# Patient Record
Sex: FEMALE | Race: BLACK OR AFRICAN AMERICAN | NOT HISPANIC OR LATINO | Employment: UNEMPLOYED | URBAN - METROPOLITAN AREA
[De-identification: names, ages, dates, MRNs, and addresses within clinical notes are randomized per-mention and may not be internally consistent; named-entity substitution may affect disease eponyms.]

---

## 2017-01-23 ENCOUNTER — HISTORICAL (OUTPATIENT)
Dept: INTERNAL MEDICINE | Facility: CLINIC | Age: 47
End: 2017-01-23

## 2017-08-28 ENCOUNTER — HISTORICAL (OUTPATIENT)
Dept: WOUND CARE | Facility: HOSPITAL | Age: 47
End: 2017-08-28

## 2017-08-28 LAB
HAV IGM SERPL QL IA: NONREACTIVE
HBV CORE IGM SERPL QL IA: NONREACTIVE
HBV SURFACE AG SERPL QL IA: NEGATIVE
HCV AB SERPL QL IA: NONREACTIVE
HIV 1+2 AB+HIV1 P24 AG SERPL QL IA: NONREACTIVE
T PALLIDUM AB SER QL: NONREACTIVE

## 2017-09-12 ENCOUNTER — HISTORICAL (OUTPATIENT)
Dept: WOUND CARE | Facility: HOSPITAL | Age: 47
End: 2017-09-12

## 2018-04-16 ENCOUNTER — HISTORICAL (OUTPATIENT)
Dept: INTERNAL MEDICINE | Facility: CLINIC | Age: 48
End: 2018-04-16

## 2018-04-16 LAB
ABS NEUT (OLG): 3.93 X10(3)/MCL (ref 2.1–9.2)
ALBUMIN SERPL-MCNC: 3.8 GM/DL (ref 3.4–5)
ALBUMIN/GLOB SERPL: 1 RATIO (ref 1–2)
ALP SERPL-CCNC: 111 UNIT/L (ref 45–117)
ALT SERPL-CCNC: 25 UNIT/L (ref 12–78)
APPEARANCE, UA: ABNORMAL
AST SERPL-CCNC: 14 UNIT/L (ref 15–37)
BACTERIA #/AREA URNS AUTO: ABNORMAL /[HPF]
BASOPHILS # BLD AUTO: 0.05 X10(3)/MCL
BASOPHILS NFR BLD AUTO: 1 %
BILIRUB SERPL-MCNC: 0.3 MG/DL (ref 0.2–1)
BILIRUB UR QL STRIP: NEGATIVE
BILIRUBIN DIRECT+TOT PNL SERPL-MCNC: <0.1 MG/DL
BILIRUBIN DIRECT+TOT PNL SERPL-MCNC: ABNORMAL MG/DL
BUN SERPL-MCNC: 18 MG/DL (ref 7–18)
CALCIUM SERPL-MCNC: 8.8 MG/DL (ref 8.5–10.1)
CHLORIDE SERPL-SCNC: 105 MMOL/L (ref 98–107)
CHOLEST SERPL-MCNC: 230 MG/DL
CHOLEST/HDLC SERPL: 3.4 {RATIO} (ref 0–4.4)
CO2 SERPL-SCNC: 29 MMOL/L (ref 21–32)
COLOR UR: YELLOW
CREAT SERPL-MCNC: 1 MG/DL (ref 0.6–1.3)
EOSINOPHIL # BLD AUTO: 0.13 10*3/UL
EOSINOPHIL NFR BLD AUTO: 2 %
ERYTHROCYTE [DISTWIDTH] IN BLOOD BY AUTOMATED COUNT: 13.8 % (ref 11.5–14.5)
EST. AVERAGE GLUCOSE BLD GHB EST-MCNC: 117 MG/DL
GLOBULIN SER-MCNC: 3.7 GM/ML (ref 2.3–3.5)
GLUCOSE (UA): NORMAL
GLUCOSE SERPL-MCNC: 103 MG/DL (ref 74–106)
HBA1C MFR BLD: 5.7 % (ref 4.2–6.3)
HCT VFR BLD AUTO: 39.4 % (ref 35–46)
HDLC SERPL-MCNC: 67 MG/DL
HGB BLD-MCNC: 12 GM/DL (ref 12–16)
HGB UR QL STRIP: NEGATIVE
HYALINE CASTS #/AREA URNS LPF: 0 /[LPF]
IMM GRANULOCYTES # BLD AUTO: 0.01 10*3/UL
IMM GRANULOCYTES NFR BLD AUTO: 0 %
KETONES UR QL STRIP: ABNORMAL
LDLC SERPL CALC-MCNC: 133 MG/DL (ref 0–130)
LEUKOCYTE ESTERASE UR QL STRIP: 75 LEU/UL
LYMPHOCYTES # BLD AUTO: 2.97 X10(3)/MCL
LYMPHOCYTES NFR BLD AUTO: 39 % (ref 13–40)
MCH RBC QN AUTO: 21.6 PG (ref 26–34)
MCHC RBC AUTO-ENTMCNC: 30.5 GM/DL (ref 31–37)
MCV RBC AUTO: 71 FL (ref 80–100)
MONOCYTES # BLD AUTO: 0.5 X10(3)/MCL
MONOCYTES NFR BLD AUTO: 7 % (ref 4–12)
MUCOUS THREADS URNS QL MICRO: ABNORMAL
NEUTROPHILS # BLD AUTO: 3.93 X10(3)/MCL
NEUTROPHILS NFR BLD AUTO: 52 X10(3)/MCL
NITRITE UR QL STRIP: NEGATIVE
PH UR STRIP: 5.5 [PH] (ref 4.5–8)
PLATELET # BLD AUTO: 301 X10(3)/MCL (ref 130–400)
PMV BLD AUTO: 11 FL (ref 7.4–10.4)
POTASSIUM SERPL-SCNC: 4 MMOL/L (ref 3.5–5.1)
PROT SERPL-MCNC: 7.5 GM/DL (ref 6.4–8.2)
PROT UR QL STRIP: 30 MG/DL
RBC # BLD AUTO: 5.55 X10(6)/MCL (ref 4–5.2)
RBC #/AREA URNS AUTO: ABNORMAL /[HPF]
SODIUM SERPL-SCNC: 141 MMOL/L (ref 136–145)
SP GR UR STRIP: 1.03 (ref 1–1.03)
SQUAMOUS #/AREA URNS LPF: >100 /[LPF]
TRIGL SERPL-MCNC: 151 MG/DL
TSH SERPL-ACNC: 1.52 MIU/L (ref 0.36–3.74)
UROBILINOGEN UR STRIP-ACNC: 2 MG/DL
VLDLC SERPL CALC-MCNC: 30 MG/DL
WBC # SPEC AUTO: 7.6 X10(3)/MCL (ref 4.5–11)
WBC #/AREA URNS AUTO: ABNORMAL /HPF

## 2018-06-21 ENCOUNTER — HISTORICAL (OUTPATIENT)
Dept: ADMINISTRATIVE | Facility: HOSPITAL | Age: 48
End: 2018-06-21

## 2018-08-29 LAB
HUMAN PAPILLOMAVIRUS (HPV): NORMAL
PAP RECOMMENDATION EXT: NORMAL
PAP SMEAR: NORMAL

## 2018-09-13 ENCOUNTER — HISTORICAL (OUTPATIENT)
Dept: INTERNAL MEDICINE | Facility: CLINIC | Age: 48
End: 2018-09-13

## 2018-09-13 LAB
ABS NEUT (OLG): 5.3 X10(3)/MCL (ref 2.1–9.2)
BASOPHILS # BLD AUTO: 0.02 X10(3)/MCL
BASOPHILS NFR BLD AUTO: 0 %
CHOLEST SERPL-MCNC: 211 MG/DL
CHOLEST/HDLC SERPL: 3 {RATIO} (ref 0–4.4)
EOSINOPHIL # BLD AUTO: 0.12 X10(3)/MCL
EOSINOPHIL NFR BLD AUTO: 1 %
ERYTHROCYTE [DISTWIDTH] IN BLOOD BY AUTOMATED COUNT: 14.3 % (ref 11.5–14.5)
HCT VFR BLD AUTO: 40.6 % (ref 35–46)
HDLC SERPL-MCNC: 71 MG/DL
HGB BLD-MCNC: 12.4 GM/DL (ref 12–16)
IMM GRANULOCYTES # BLD AUTO: 0.02 10*3/UL
IMM GRANULOCYTES NFR BLD AUTO: 0 %
LDLC SERPL CALC-MCNC: 110 MG/DL (ref 0–130)
LYMPHOCYTES # BLD AUTO: 2.61 X10(3)/MCL
LYMPHOCYTES NFR BLD AUTO: 30 % (ref 13–40)
MCH RBC QN AUTO: 21.7 PG (ref 26–34)
MCHC RBC AUTO-ENTMCNC: 30.5 GM/DL (ref 31–37)
MCV RBC AUTO: 71 FL (ref 80–100)
MONOCYTES # BLD AUTO: 0.55 X10(3)/MCL
MONOCYTES NFR BLD AUTO: 6 % (ref 4–12)
NEUTROPHILS # BLD AUTO: 5.3 X10(3)/MCL
NEUTROPHILS NFR BLD AUTO: 62 X10(3)/MCL
PLATELET # BLD AUTO: 310 X10(3)/MCL (ref 130–400)
PMV BLD AUTO: 11.1 FL (ref 7.4–10.4)
RBC # BLD AUTO: 5.72 X10(6)/MCL (ref 4–5.2)
TRIGL SERPL-MCNC: 149 MG/DL
VLDLC SERPL CALC-MCNC: 30 MG/DL
WBC # SPEC AUTO: 8.6 X10(3)/MCL (ref 4.5–11)

## 2021-04-24 ENCOUNTER — HISTORICAL (OUTPATIENT)
Dept: LAB | Facility: HOSPITAL | Age: 51
End: 2021-04-24

## 2021-04-24 LAB
ABS NEUT (OLG): 4.12 X10(3)/MCL (ref 2.1–9.2)
ALBUMIN SERPL-MCNC: 4 GM/DL (ref 3.5–5)
ALBUMIN/GLOB SERPL: 1.2 RATIO (ref 1.1–2)
ALP SERPL-CCNC: 81 UNIT/L (ref 40–150)
ALT SERPL-CCNC: 19 UNIT/L (ref 0–55)
APPEARANCE, UA: CLEAR
AST SERPL-CCNC: 15 UNIT/L (ref 5–34)
BASOPHILS # BLD AUTO: 0 X10(3)/MCL (ref 0–0.2)
BASOPHILS NFR BLD AUTO: 0 %
BILIRUB SERPL-MCNC: 0.5 MG/DL
BILIRUB UR QL STRIP: NEGATIVE
BILIRUBIN DIRECT+TOT PNL SERPL-MCNC: 0.2 MG/DL (ref 0–0.5)
BILIRUBIN DIRECT+TOT PNL SERPL-MCNC: 0.3 MG/DL (ref 0–0.8)
BUN SERPL-MCNC: 18.1 MG/DL (ref 9.8–20.1)
CALCIUM SERPL-MCNC: 9.6 MG/DL (ref 8.4–10.2)
CHLORIDE SERPL-SCNC: 105 MMOL/L (ref 98–107)
CHOLEST SERPL-MCNC: 227 MG/DL
CHOLEST/HDLC SERPL: 3 {RATIO} (ref 0–5)
CO2 SERPL-SCNC: 28 MMOL/L (ref 22–29)
COLOR UR: NORMAL
CREAT SERPL-MCNC: 0.83 MG/DL (ref 0.55–1.02)
EOSINOPHIL # BLD AUTO: 0.1 X10(3)/MCL (ref 0–0.9)
EOSINOPHIL NFR BLD AUTO: 2 %
ERYTHROCYTE [DISTWIDTH] IN BLOOD BY AUTOMATED COUNT: 14.1 % (ref 11.5–14.5)
EST. AVERAGE GLUCOSE BLD GHB EST-MCNC: 116.9 MG/DL
GLOBULIN SER-MCNC: 3.2 GM/DL (ref 2.4–3.5)
GLUCOSE (UA): NEGATIVE
GLUCOSE SERPL-MCNC: 101 MG/DL (ref 74–100)
HBA1C MFR BLD: 5.7 %
HCT VFR BLD AUTO: 40.2 % (ref 35–46)
HDLC SERPL-MCNC: 78 MG/DL (ref 35–60)
HEMOCCULT STL QL IA: NEGATIVE
HGB BLD-MCNC: 12.2 GM/DL (ref 12–16)
HGB UR QL STRIP: NEGATIVE
IMM GRANULOCYTES # BLD AUTO: 0.01 10*3/UL
IMM GRANULOCYTES NFR BLD AUTO: 0 %
KETONES UR QL STRIP: NEGATIVE
LDLC SERPL CALC-MCNC: 132 MG/DL (ref 50–140)
LEUKOCYTE ESTERASE UR QL STRIP: NEGATIVE
LYMPHOCYTES # BLD AUTO: 2.6 X10(3)/MCL (ref 0.6–4.6)
LYMPHOCYTES NFR BLD AUTO: 36 %
MCH RBC QN AUTO: 22.2 PG (ref 26–34)
MCHC RBC AUTO-ENTMCNC: 30.3 GM/DL (ref 31–37)
MCV RBC AUTO: 73.1 FL (ref 80–100)
MONOCYTES # BLD AUTO: 0.4 X10(3)/MCL (ref 0.1–1.3)
MONOCYTES NFR BLD AUTO: 6 %
NEUTROPHILS # BLD AUTO: 4.12 X10(3)/MCL (ref 2.1–9.2)
NEUTROPHILS NFR BLD AUTO: 56 %
NITRITE UR QL STRIP: NEGATIVE
PH UR STRIP: 6 [PH] (ref 4.5–8)
PLATELET # BLD AUTO: 277 X10(3)/MCL (ref 130–400)
PMV BLD AUTO: 9.8 FL (ref 7.4–10.4)
POTASSIUM SERPL-SCNC: 4.2 MMOL/L (ref 3.5–5.1)
PROT SERPL-MCNC: 7.2 GM/DL (ref 6.4–8.3)
PROT UR QL STRIP: NEGATIVE
RBC # BLD AUTO: 5.5 X10(6)/MCL (ref 4–5.2)
SODIUM SERPL-SCNC: 141 MMOL/L (ref 136–145)
SP GR UR STRIP: 1.02 (ref 1–1.03)
TRIGL SERPL-MCNC: 84 MG/DL (ref 37–140)
TSH SERPL-ACNC: 1.26 UIU/ML (ref 0.35–4.94)
UROBILINOGEN UR STRIP-ACNC: NORMAL
VLDLC SERPL CALC-MCNC: 17 MG/DL
WBC # SPEC AUTO: 7.3 X10(3)/MCL (ref 4.5–11)

## 2021-06-01 ENCOUNTER — HISTORICAL (OUTPATIENT)
Dept: RADIOLOGY | Facility: HOSPITAL | Age: 51
End: 2021-06-01

## 2021-08-19 ENCOUNTER — HISTORICAL (OUTPATIENT)
Dept: SLEEP MEDICINE | Facility: HOSPITAL | Age: 51
End: 2021-08-19

## 2022-04-07 ENCOUNTER — HISTORICAL (OUTPATIENT)
Dept: ADMINISTRATIVE | Facility: HOSPITAL | Age: 52
End: 2022-04-07

## 2022-04-10 ENCOUNTER — HISTORICAL (OUTPATIENT)
Dept: ADMINISTRATIVE | Facility: HOSPITAL | Age: 52
End: 2022-04-10

## 2022-04-27 VITALS
OXYGEN SATURATION: 99 % | DIASTOLIC BLOOD PRESSURE: 80 MMHG | SYSTOLIC BLOOD PRESSURE: 121 MMHG | BODY MASS INDEX: 28.53 KG/M2 | HEIGHT: 66 IN | WEIGHT: 177.5 LBS

## 2022-05-04 NOTE — HISTORICAL [facility] CERNER
This is a historical note converted from [employer]. Formatting and pictures may have been removed.  Please reference [employer] for original formatting and attached multimedia. Chief Complaint  follow up/pain in spine off and on  History of Present Illness  47 y/o female here for [1] f/u. Pt has hx Migraine HA., Anxiety, obesity. [2] Pt c/o of neck and LBP off and on X 2 years. Denies other complaints.  Review of Systems  Constitutional: negative except as stated in HPI  Eye: negative except as stated in HPI  ENT: negative except as stated in HPI  Respiratory: negative except as stated in HPI  Cardiovascular: negative except as stated in HPI  Gastrointestinal: negative except as stated in HPI  Genitourinary: negative except as stated in HPI  Heme/Lymph: negative except as stated in HPI  Endocrine: negative except as stated in HPI  Immunologic: negative except as stated in HPI  Musculoskeletal: negative except as stated in HPI  Integumentary: negative except as stated in HPI  Neurologic: negative except as stated in HPI  ?   All Other ROS_ negative except as stated in HPI  ?  Physical Exam  Vitals & Measurements  T:?36.7? ?C (Oral)? HR:?84(Peripheral)? RR:?18? BP:?110/73?  HT:?167?cm? HT:?167?cm? WT:?85?kg? WT:?85?kg? BMI:?30.48?  General: Alert and oriented, No acute distress.  Eye: Pupils are equal, round and reactive to light, Extraocular movements are intact.  HENT: Normocephalic.  Neck: Supple, Non-tender, No carotid bruit, No lymphadenopathy.  Respiratory: Lungs are clear to auscultation, Respirations are non-labored, Breath sounds are equal, Symmetrical chest wall expansion.  Cardiovascular: Normal rate, Regular rhythm, No murmur.  Gastrointestinal: Soft, Non-tender, Non-distended, Normal bowel sounds.  Musculoskeletal: Normal range of motion.  Integumentary: Warm, Dry, Intact.  Neurologic: No focal deficits, Cranial Nerves II-XII are grossly intact.  Assessment/Plan  Abnormal CBC  ?CBC in 3 months.  Ordered:  CBC w/  Auto Diff, Routine collect, *Est. 09/21/18 3:00:00 CDT, Blood, Order for future visit, *Est. Stop date 09/21/18 3:00:00 CDT, Lab Collect, Abnormal CBC, 06/21/18 14:16:00 CDT  Office/Outpatient Visit Level 4 Established 52801 PC, Anxiety  HA (headache)  Obesity  Well adult exam  Neck pain  Back pain  Abnormal CBC  HLD (hyperlipidemia), [facility], 06/21/18 14:19:00 CDT  ?  Anxiety  ?Denies SI/HI. ?Pt states has tried a number of anxiety and depression meds without improvement- state meds just make her sleep and more depressed but when not on meds she is fine. States she thinks she just needs counseling. Informed to go to [facility] and speak to a counselor to set up appt or call her medicaid card to find a mental health provider covered on her plan. [3]  Ordered:  Clinic Follow up, *Est. 09/21/18 3:00:00 CDT, in 3 months with CIELO Jerry, Order for future visit, Anxiety  HA (headache)  Obesity  Well adult exam  Neck pain  Back pain, [facility]  Office/Outpatient Visit Level 4 Established [id number] PC, Anxiety  HA (headache)  Obesity  Well adult exam  Neck pain  Back pain  Abnormal CBC  HLD (hyperlipidemia), [facility], 06/21/18 14:19:00 CDT  ?  Back pain  ?XR L-spine today. Refilled Meloxicam as prescribed prn pain.  Ordered:  meloxicam, 7.5 mg = 1 tab(s), Oral, BID, PRN PRN pain, # 60 tab(s), 6 Refill(s), Pharmacy: [facility] [place]note 2[place] [id number]  Clinic Follow up, *Est. 09/21/18 3:00:00 CDT, in 3 months with CIELO Jerry, Order for future visit, Anxiety  HA (headache)  Obesity  Well adult exam  Neck pain  Back pain, [facility]  Office/Outpatient Visit Level 4 Established 23745 PC, Anxiety  HA (headache)  Obesity  Well adult exam  Neck pain  Back pain  Abnormal CBC  HLD (hyperlipidemia), [facility], 06/21/18 14:19:00 CDT  XR Spine Lumbar 2 or 3 Views, Routine, *Est. 06/21/18 3:00:00 CDT, Back Pain, None, Ambulatory, Rad Type, Order for future visit, Back pain, *Est. 06/21/18  3:00:00 CDT  ?  HA (headache)  ?States controlled with Fioricet. Cont med as prescribed. [3]  Ordered:  Clinic Follow up, *Est. 09/21/18 3:00:00 CDT, in 3 months with CIELO Jerry, Order for future visit, Anxiety  HA (headache)  Obesity  Well adult exam  Neck pain  Back pain, [facility]  Office/Outpatient Visit Level 4 Established 19634 PC, Anxiety  HA (headache)  Obesity  Well adult exam  Neck pain  Back pain  Abnormal CBC  HLD (hyperlipidemia), [facility], 06/21/18 14:19:00 CDT  ?  HLD (hyperlipidemia)  ?Low fat diet and exercise. FLP in 3 months.  Ordered:  Lipid Panel, Routine collect, *Est. 09/21/18 3:00:00 CDT, Blood, Order for future visit, *Est. Stop date 09/21/18 3:00:00 CDT, Lab Collect, HLD (hyperlipidemia), 06/21/18 14:16:00 CDT  Office/Outpatient Visit Level 4 Established 83783 PC, Anxiety  HA (headache)  Obesity  Well adult exam  Neck pain  Back pain  Abnormal CBC  HLD (hyperlipidemia), [facility], 06/21/18 14:19:00 CDT  ?  Neck pain  ?XR C-spine today. Refilled Meloxicam as prescribed prn pain.  Ordered:  meloxicam, 7.5 mg = 1 tab(s), Oral, BID, PRN PRN pain, # 60 tab(s), 6 Refill(s), Pharmacy: [id number]  Clinic Follow up, *Est. 09/21/18 3:00:00 CDT, in 3 months with CIELO Jerry, Order for future visit, Anxiety  HA (headache)  Obesity  Well adult exam  Neck pain  Back pain, [facility]  Office/Outpatient Visit Level 4 Established 84988 PC, Anxiety  HA (headache)  Obesity  Well adult exam  Neck pain  Back pain  Abnormal CBC  HLD (hyperlipidemia), [facility], 06/21/18 14:19:00 CDT  XR Spine Cervical 2 or 3 Views, Routine, *Est. 06/21/18 3:00:00 CDT, Pain, None, Ambulatory, Rad Type, Order for future visit, Neck pain, *Est. 06/21/18 3:00:00 CDT  ?  Obesity  ?Low fat diet and exercise. Education provided.  Ordered:  Clinic Follow up, *Est. 09/21/18 3:00:00 CDT, in 3 months with CIELO Jerry, Order for future visit, Anxiety  HA (headache)  Obesity   Well adult exam  Neck pain  Back pain, [facility]  Office/Outpatient Visit Level 4 Established 67255 PC, Anxiety  HA (headache)  Obesity  Well adult exam  Neck pain  Back pain  Abnormal CBC  HLD (hyperlipidemia), [facility], 06/21/18 14:19:00 CDT  ?  Well adult exam  ?Labs in 3 months.  Ordered:  Clinic Follow up, *Est. 09/21/18 3:00:00 CDT, in 3 months with CIELO Jerry, Order for future visit, Anxiety  HA (headache)  Obesity  Well adult exam  Neck pain  Back pain, [facility]  Office/Outpatient Visit Level 4 Established 64430 PC, Anxiety  HA (headache)  Obesity  Well adult exam  Neck pain  Back pain  Abnormal CBC  HLD (hyperlipidemia), [facility], 06/21/18 14:19:00 CDT  ?  RTC in 3 months with labs 1 week prior to appt.   Problem List/Past Medical History  Ongoing  Acute constipation(  Confirmed  )  Anemia  Anemia(  Confirmed  )  Anxiety  Chronic back pain greater than three months duration  Depression  Fibroid, uterine(  Confirmed  )  Insomnia  Obesity  Historical  No qualifying data  Procedure/Surgical History  Tubal ligation (2000), Tubal ligation.  Medications  loratadine 10 mg oral capsule, 10 mg= 1 cap(s), Oral, Daily  meloxicam 7.5 mg oral tablet, 7.5 mg= 1 tab(s), Oral, BID, PRN, 6 refills  pravastatin 10 mg oral tablet, 10 mg= 1 tab(s), Oral, Daily, 11 refills  Allergies  CeleBREX  PDI Insect Sting Relief  ibuprofen  penicillins  sulfa drugs  Social History  Alcohol  Current, Liquor, 1-2 times per year, Alcohol use interferes with work or home: Yes. Drinks more than intended: Yes. Others hurt by drinking: No. Ready to change: Yes., 04/16/2018  Current, Wine, 1-2 times per year, 11/05/2017  Employment/School  Unemployed, 02/07/2017  Employed, Highest education level: Some college., 11/23/2015  Activity level: Moderate physical work. Highest education level: Some college., 05/22/2015  Employed, 03/18/2015  Exercise  Self assessment: Fair condition., 11/23/2015  Self  assessment: Fair condition., 05/22/2015  Exercise frequency: 3-4 times/week., 03/18/2015  Home/Environment  Lives with Children. Living situation: Home/Independent. Alcohol abuse in household: No. Substance abuse in household: No. Smoker in household: No. Injuries/Abuse/Neglect in household: No. Feels unsafe at home: No. Safe place to go: Yes. Family/Friends available for support: No., 11/23/2015  Lives with Children. Living situation: Home/Independent. Alcohol abuse in household: No. Substance abuse in household: No. Smoker in household: No. Feels unsafe at home: No. Safe place to go: Yes. Family/Friends available for support: Yes. Major illness in household: No., 05/22/2015  Nutrition/Health  Type of diet: NO BEEF. Regular, 11/23/2015  Sleeping concerns: No. Feels highly stressed: No., 05/22/2015  Regular, 03/18/2015  Sexual  Sexually active: No., 03/18/2015  Substance Abuse - Denies Substance Abuse, 03/18/2015  Never, 11/05/2017  Tobacco - Denies Tobacco Use, 05/22/2015  Never smoker, 11/05/2017  Family History  Anxiety: Mother.  Arthritis: Father.  Bipolar: Mother.  Diabetes mellitus type 2: Mother.  Heart disease: Father.  Hepatitis C: Father.  Hypertension.: Father.  Nervous.: Mother and Father.  Immunizations  Vaccine Date Status   tetanus toxoid 08/17/2011 Recorded   Health Maintenance  Health Maintenance  ???Pending?(in the next year)  ??? ??OverDue  ??? ? ? ?Diabetes Screening due??and every?  ??? ? ? ?Cervical Cancer Screening due??03/17/18??and every 3??year(s)  ??? ??Due In Future?  ??? ? ? ?Blood Pressure Screening not due until??01/19/19??and every 1??year(s)  ??? ? ? ?Alcohol Misuse Screening not due until??01/19/19??and every 1??year(s)  ??? ? ? ?Body Mass Index Check not due until??04/16/19??and every 1??year(s)  ???Satisfied?(in the past 1 year)  ??? ??Satisfied?  ??? ? ? ?Alcohol Misuse Screening on??01/19/18.??Satisfied by Rosalino CHAUDHARI, Klein MANCUSO.  ??? ? ? ?Blood Pressure Screening  on??06/21/18.??Satisfied by Maura Tim LPN  ??? ? ? ?Body Mass Index Check on??06/21/18.??Satisfied by Maura Tim LPN  ??? ? ? ?Breast Cancer Screening on??09/12/17.??Satisfied by Roxanne Angeles  ??? ? ? ?Depression Screening on??06/21/18.??Satisfied by Maura Tim LPN  ??? ? ? ?Diabetes Screening on??08/08/18.??Satisfied by Kelin Jerry NP  ??? ? ? ?Lipid Screening on??08/08/18.??Satisfied by Kelin Jerry NP  ??? ? ? ?Obesity Screening on??06/21/18.??Satisfied by Maura Tim LPN  ??? ? ? ?Tobacco Use Screening on??06/21/18.??Satisfied by Maura Tim LPN  ??? ??Postponed?  ??? ? ? ?Tetanus Vaccine until??01/19/18.??Recorded for Kelin Jerry NP  ??? ??Refused?  ??? ? ? ?Influenza Vaccine on??01/19/18.??Recorded for Kelin Jerry NP??Reason: Patient Refuses  ?  ?     [1]?Office Visit Note; Kelin Jerry NP 01/19/2018 08:59 CST  [2]?Office Visit Note; Kelin Jerry NP 01/19/2018 08:59 CST  [3]?Office Visit Note; Kelin Jerry NP 01/19/2018 08:59 CST

## 2022-07-22 ENCOUNTER — PATIENT OUTREACH (OUTPATIENT)
Dept: ADMINISTRATIVE | Facility: HOSPITAL | Age: 52
End: 2022-07-22

## 2022-07-22 NOTE — PROGRESS NOTES
Population Health. Out Reach.  The following record(s)  below were uploaded for Health Maintenance .         4/24/21 FIT  KIT  8/29/18 PAP SMEAR        8/29/18 HPV

## 2022-10-13 ENCOUNTER — HOSPITAL ENCOUNTER (EMERGENCY)
Facility: HOSPITAL | Age: 52
Discharge: HOME | End: 2022-10-13
Attending: INTERNAL MEDICINE

## 2022-10-13 VITALS
BODY MASS INDEX: 30.11 KG/M2 | SYSTOLIC BLOOD PRESSURE: 121 MMHG | OXYGEN SATURATION: 97 % | HEIGHT: 66 IN | TEMPERATURE: 98 F | DIASTOLIC BLOOD PRESSURE: 82 MMHG | RESPIRATION RATE: 20 BRPM | WEIGHT: 187.38 LBS | HEART RATE: 70 BPM

## 2022-10-13 DIAGNOSIS — K59.00 CONSTIPATION: ICD-10-CM

## 2022-10-13 DIAGNOSIS — N30.00 ACUTE CYSTITIS WITHOUT HEMATURIA: Primary | ICD-10-CM

## 2022-10-13 LAB
APPEARANCE UR: ABNORMAL
BACTERIA #/AREA URNS AUTO: ABNORMAL /HPF
BASOPHILS # BLD AUTO: 0.03 X10(3)/MCL (ref 0–0.2)
BASOPHILS NFR BLD AUTO: 0.4 %
BILIRUB UR QL STRIP.AUTO: NEGATIVE MG/DL
C TRACH DNA SPEC QL NAA+PROBE: NOT DETECTED
CLUE CELLS VAG QL WET PREP: ABNORMAL
COLOR UR AUTO: ABNORMAL
EOSINOPHIL # BLD AUTO: 0.17 X10(3)/MCL (ref 0–0.9)
EOSINOPHIL NFR BLD AUTO: 2.4 %
ERYTHROCYTE [DISTWIDTH] IN BLOOD BY AUTOMATED COUNT: 14.6 % (ref 11.5–17)
GLUCOSE UR QL STRIP.AUTO: NORMAL MG/DL
HCT VFR BLD AUTO: 37.7 % (ref 37–47)
HGB BLD-MCNC: 11.7 GM/DL (ref 12–16)
HYALINE CASTS #/AREA URNS LPF: ABNORMAL /LPF
IMM GRANULOCYTES # BLD AUTO: 0.03 X10(3)/MCL (ref 0–0.04)
IMM GRANULOCYTES NFR BLD AUTO: 0.4 %
KETONES UR QL STRIP.AUTO: NEGATIVE MG/DL
LEUKOCYTE ESTERASE UR QL STRIP.AUTO: 500 UNIT/L
LYMPHOCYTES # BLD AUTO: 2.6 X10(3)/MCL (ref 0.6–4.6)
LYMPHOCYTES NFR BLD AUTO: 36.4 %
MCH RBC QN AUTO: 22.1 PG (ref 27–31)
MCHC RBC AUTO-ENTMCNC: 31 MG/DL (ref 33–36)
MCV RBC AUTO: 71.3 FL (ref 80–94)
MONOCYTES # BLD AUTO: 0.37 X10(3)/MCL (ref 0.1–1.3)
MONOCYTES NFR BLD AUTO: 5.2 %
MUCOUS THREADS URNS QL MICRO: ABNORMAL /LPF
N GONORRHOEA DNA SPEC QL NAA+PROBE: NOT DETECTED
NEUTROPHILS # BLD AUTO: 3.9 X10(3)/MCL (ref 2.1–9.2)
NEUTROPHILS NFR BLD AUTO: 55.2 %
NITRITE UR QL STRIP.AUTO: NEGATIVE
NRBC BLD AUTO-RTO: 0 %
PH UR STRIP.AUTO: 5.5 [PH]
PLATELET # BLD AUTO: 285 X10(3)/MCL (ref 130–400)
PMV BLD AUTO: 10.2 FL (ref 7.4–10.4)
PROT UR QL STRIP.AUTO: NEGATIVE MG/DL
RBC # BLD AUTO: 5.29 X10(6)/MCL (ref 4.2–5.4)
RBC #/AREA URNS AUTO: ABNORMAL /HPF
RBC UR QL AUTO: ABNORMAL UNIT/L
SP GR UR STRIP.AUTO: 1.03
SQUAMOUS #/AREA URNS LPF: ABNORMAL /HPF
T VAGINALIS VAG QL WET PREP: ABNORMAL
UROBILINOGEN UR STRIP-ACNC: NORMAL MG/DL
WBC # SPEC AUTO: 7.1 X10(3)/MCL (ref 4.5–11.5)
WBC #/AREA URNS AUTO: ABNORMAL /HPF
WBC #/AREA VAG WET PREP: ABNORMAL
YEAST SPEC QL WET PREP: ABNORMAL

## 2022-10-13 PROCEDURE — 87491 CHLMYD TRACH DNA AMP PROBE: CPT | Performed by: INTERNAL MEDICINE

## 2022-10-13 PROCEDURE — 99284 EMERGENCY DEPT VISIT MOD MDM: CPT | Mod: 25

## 2022-10-13 PROCEDURE — 85025 COMPLETE CBC W/AUTO DIFF WBC: CPT | Performed by: INTERNAL MEDICINE

## 2022-10-13 PROCEDURE — 36415 COLL VENOUS BLD VENIPUNCTURE: CPT | Performed by: INTERNAL MEDICINE

## 2022-10-13 PROCEDURE — 87591 N.GONORRHOEAE DNA AMP PROB: CPT | Performed by: INTERNAL MEDICINE

## 2022-10-13 PROCEDURE — 87210 SMEAR WET MOUNT SALINE/INK: CPT | Performed by: INTERNAL MEDICINE

## 2022-10-13 PROCEDURE — 81001 URINALYSIS AUTO W/SCOPE: CPT | Performed by: INTERNAL MEDICINE

## 2022-10-13 RX ORDER — POLYETHYLENE GLYCOL 3350 17 G/17G
17 POWDER, FOR SOLUTION ORAL DAILY
Qty: 289 G | Refills: 0 | Status: SHIPPED | OUTPATIENT
Start: 2022-10-13

## 2022-10-13 RX ORDER — NITROFURANTOIN 25; 75 MG/1; MG/1
100 CAPSULE ORAL 2 TIMES DAILY
Qty: 10 CAPSULE | Refills: 0 | Status: SHIPPED | OUTPATIENT
Start: 2022-10-13 | End: 2022-10-18

## 2022-10-13 NOTE — ED PROVIDER NOTES
Encounter Date: 10/13/2022       History     Chief Complaint   Patient presents with    Vaginal Bleeding    Abdominal Pain     PT REPORTS ABD CRAMPING W VAG BLEEDING SINCE THIS AM, PT STATES SHE HAS NOT HAD A PERIOD > 7 YRS.  ONLY SPOTTING AT PRESENT. ALSO REPORTS HEAVY LIFTING DUE TO RECENT MOVE, CO LT GROIN AND RT FLANK PAIN.  NO DYSURIA REPORTED.       Presents with vaginal bleeding and abdominal pain today.    The history is provided by the patient.   Vaginal Bleeding  This is a new problem. The current episode started 3 to 5 hours ago. The problem occurs rarely. Associated symptoms include abdominal pain. Pertinent negatives include no chest pain, no headaches and no shortness of breath. Nothing aggravates the symptoms. Nothing relieves the symptoms. She has tried nothing for the symptoms.   Review of patient's allergies indicates:   Allergen Reactions    Ibuprofen Anaphylaxis    Benzocain-camphor-allatoin-pet     Celecoxib     Penicillins Itching    Sulfamethoxazole-trimethoprim Palpitations     No past medical history on file.  No past surgical history on file.  No family history on file.     Review of Systems   Constitutional:  Negative for fever.   HENT:  Negative for sore throat.    Respiratory:  Negative for shortness of breath.    Cardiovascular:  Negative for chest pain.   Gastrointestinal:  Positive for abdominal pain. Negative for nausea.   Genitourinary:  Positive for vaginal bleeding. Negative for dysuria.   Musculoskeletal:  Negative for back pain.   Skin:  Negative for rash.   Neurological:  Negative for weakness and headaches.   Hematological:  Does not bruise/bleed easily.   All other systems reviewed and are negative.    Physical Exam     Initial Vitals [10/13/22 0748]   BP Pulse Resp Temp SpO2   134/83 87 16 97.9 °F (36.6 °C) 97 %      MAP       --         Physical Exam    Nursing note and vitals reviewed.  Constitutional: She appears well-developed and well-nourished.   HENT:   Head:  Normocephalic and atraumatic.   Mouth/Throat: Oropharynx is clear and moist.   Eyes: Conjunctivae are normal. Pupils are equal, round, and reactive to light.   Neck: Neck supple.   Normal range of motion.  Cardiovascular:  Normal rate, regular rhythm, normal heart sounds and intact distal pulses.           Pulmonary/Chest: Breath sounds normal.   Abdominal: Abdomen is soft. Bowel sounds are normal. She exhibits no distension. There is no abdominal tenderness. Hernia confirmed negative in the right inguinal area and confirmed negative in the left inguinal area. There is no rebound and no guarding.   Genitourinary:    Rectum normal.   Rectum:      No external hemorrhoid.      Pelvic exam was performed with patient supine.   There is no rash, tenderness, lesion or injury on the right labia. There is no rash, tenderness, lesion or injury on the left labia. Cervix exhibits no motion tenderness, no discharge and no friability. Right adnexum displays no mass, no tenderness and no fullness. Left adnexum displays no mass, no tenderness and no fullness.    Vaginal discharge (white) present.      No vaginal erythema, tenderness or bleeding.   No erythema, tenderness or bleeding in the vagina.    No foreign body in the vagina.      No signs of injury in the vagina.     Musculoskeletal:         General: No edema. Normal range of motion.      Cervical back: Normal range of motion and neck supple.     Lymphadenopathy: No inguinal adenopathy noted on the right or left side.   Neurological: She is alert and oriented to person, place, and time. She has normal strength. GCS score is 15. GCS eye subscore is 4. GCS verbal subscore is 5. GCS motor subscore is 6.   Skin: Skin is warm and dry. No rash noted.   Psychiatric: Her behavior is normal.       ED Course   Procedures  Labs Reviewed   WET PREP, GENITAL - Abnormal; Notable for the following components:       Result Value    WBC, Wet Prep Few (*)     Clue Cells, Wet Prep Rare (*)      All other components within normal limits   CBC WITH DIFFERENTIAL - Abnormal; Notable for the following components:    Hgb 11.7 (*)     MCV 71.3 (*)     MCH 22.1 (*)     MCHC 31.0 (*)     All other components within normal limits   URINALYSIS - Abnormal; Notable for the following components:    Appearance, UA Turbid (*)     Blood, UA Trace (*)     Leukocyte Esterase,  (*)     Squamous Epithelial Cells, UA Many (*)     Mucous, UA Trace (*)     All other components within normal limits   CHLAMYDIA/GONORRHOEAE(GC), PCR   CBC W/ AUTO DIFFERENTIAL    Narrative:     The following orders were created for panel order CBC auto differential.  Procedure                               Abnormality         Status                     ---------                               -----------         ------                     CBC with Differential[[id number]]        Abnormal            Final result                 Please view results for these tests on the individual orders.   EXTRA TUBES    Narrative:     The following orders were created for panel order EXTRA TUBES.  Procedure                               Abnormality         Status                     ---------                               -----------         ------                     Light Blue Top Hold[[id number]]                              In process                 Gold Top Hold[[id number]]                                    In process                   Please view results for these tests on the individual orders.   LIGHT BLUE TOP HOLD   GOLD TOP HOLD          Imaging Results              X-Ray Abdomen Flat And Erect (In process)                   X-Rays:   Independently Interpreted Readings:   Abdomen: Nonspecific bowel gas.  No free air under diaphragm.  No air fluid levels or signs of obstruction. Moderate constipation   Medications - No data to display                           Clinical Impression:   Final diagnoses:  [N30.00] Acute cystitis without hematuria  (Primary)  [K59.00] Constipation      ED Disposition Condition    Discharge Stable          ED Prescriptions       Medication Sig Dispense Start Date End Date Auth. Provider    nitrofurantoin, macrocrystal-monohydrate, (MACROBID) 100 MG capsule Take 1 capsule (100 mg total) by mouth 2 (two) times daily. for 5 days 10 capsule 10/13/2022 10/18/2022 Joe Paniagua MD    polyethylene glycol (GLYCOLAX) 17 gram/dose powder Take 17 g by mouth once daily. 289 g 10/13/2022 -- Joe Paniagua MD          Follow-up Information    None          Joe Paniagua MD  10/13/22 8225

## 2023-05-23 ENCOUNTER — HOSPITAL ENCOUNTER (EMERGENCY)
Facility: HOSPITAL | Age: 53
Discharge: HOME | End: 2023-05-23
Attending: STUDENT IN AN ORGANIZED HEALTH CARE EDUCATION/TRAINING PROGRAM

## 2023-05-23 VITALS
BODY MASS INDEX: 31.18 KG/M2 | HEIGHT: 66 IN | RESPIRATION RATE: 16 BRPM | OXYGEN SATURATION: 100 % | HEART RATE: 93 BPM | SYSTOLIC BLOOD PRESSURE: 119 MMHG | TEMPERATURE: 98 F | DIASTOLIC BLOOD PRESSURE: 76 MMHG | WEIGHT: 194 LBS

## 2023-05-23 DIAGNOSIS — K52.9 COLITIS: Primary | ICD-10-CM

## 2023-05-23 DIAGNOSIS — N85.8 UTERINE MASS: ICD-10-CM

## 2023-05-23 DIAGNOSIS — R10.13 EPIGASTRIC PAIN: ICD-10-CM

## 2023-05-23 LAB
ALBUMIN SERPL-MCNC: 4.1 G/DL (ref 3.5–5)
ALBUMIN/GLOB SERPL: 1.2 RATIO (ref 1.1–2)
ALP SERPL-CCNC: 89 UNIT/L (ref 40–150)
ALT SERPL-CCNC: 17 UNIT/L (ref 0–55)
AST SERPL-CCNC: 13 UNIT/L (ref 5–34)
BASOPHILS # BLD AUTO: 0.02 X10(3)/MCL
BASOPHILS NFR BLD AUTO: 0.2 %
BILIRUBIN DIRECT+TOT PNL SERPL-MCNC: 0.2 MG/DL
BUN SERPL-MCNC: 17.6 MG/DL (ref 9.8–20.1)
CALCIUM SERPL-MCNC: 9.5 MG/DL (ref 8.4–10.2)
CHLORIDE SERPL-SCNC: 105 MMOL/L (ref 98–107)
CO2 SERPL-SCNC: 24 MMOL/L (ref 22–29)
CREAT SERPL-MCNC: 1.09 MG/DL (ref 0.55–1.02)
EOSINOPHIL # BLD AUTO: 0.08 X10(3)/MCL (ref 0–0.9)
EOSINOPHIL NFR BLD AUTO: 0.6 %
ERYTHROCYTE [DISTWIDTH] IN BLOOD BY AUTOMATED COUNT: 14.3 % (ref 11.5–17)
GFR SERPLBLD CREATININE-BSD FMLA CKD-EPI: >60 MLS/MIN/1.73/M2
GLOBULIN SER-MCNC: 3.5 GM/DL (ref 2.4–3.5)
GLUCOSE SERPL-MCNC: 104 MG/DL (ref 74–100)
HCT VFR BLD AUTO: 40.2 % (ref 37–47)
HGB BLD-MCNC: 12.2 G/DL (ref 12–16)
IMM GRANULOCYTES # BLD AUTO: 0.03 X10(3)/MCL (ref 0–0.04)
IMM GRANULOCYTES NFR BLD AUTO: 0.2 %
LACTATE SERPL-SCNC: 1 MMOL/L (ref 0.5–2.2)
LIPASE SERPL-CCNC: 25 U/L
LYMPHOCYTES # BLD AUTO: 1.53 X10(3)/MCL (ref 0.6–4.6)
LYMPHOCYTES NFR BLD AUTO: 11.6 %
MCH RBC QN AUTO: 21.9 PG (ref 27–31)
MCHC RBC AUTO-ENTMCNC: 30.3 G/DL (ref 33–36)
MCV RBC AUTO: 72.2 FL (ref 80–94)
MONOCYTES # BLD AUTO: 0.72 X10(3)/MCL (ref 0.1–1.3)
MONOCYTES NFR BLD AUTO: 5.4 %
NEUTROPHILS # BLD AUTO: 10.86 X10(3)/MCL (ref 2.1–9.2)
NEUTROPHILS NFR BLD AUTO: 82 %
NRBC BLD AUTO-RTO: 0 %
PLATELET # BLD AUTO: 315 X10(3)/MCL (ref 130–400)
PMV BLD AUTO: 10.3 FL (ref 7.4–10.4)
POTASSIUM SERPL-SCNC: 3.8 MMOL/L (ref 3.5–5.1)
PROT SERPL-MCNC: 7.6 GM/DL (ref 6.4–8.3)
RBC # BLD AUTO: 5.57 X10(6)/MCL (ref 4.2–5.4)
SODIUM SERPL-SCNC: 137 MMOL/L (ref 136–145)
TROPONIN I SERPL-MCNC: <0.01 NG/ML (ref 0–0.04)
WBC # SPEC AUTO: 13.24 X10(3)/MCL (ref 4.5–11.5)

## 2023-05-23 PROCEDURE — 80053 COMPREHEN METABOLIC PANEL: CPT | Performed by: STUDENT IN AN ORGANIZED HEALTH CARE EDUCATION/TRAINING PROGRAM

## 2023-05-23 PROCEDURE — 87040 BLOOD CULTURE FOR BACTERIA: CPT | Performed by: STUDENT IN AN ORGANIZED HEALTH CARE EDUCATION/TRAINING PROGRAM

## 2023-05-23 PROCEDURE — 93005 ELECTROCARDIOGRAM TRACING: CPT

## 2023-05-23 PROCEDURE — 25500020 PHARM REV CODE 255

## 2023-05-23 PROCEDURE — 99285 EMERGENCY DEPT VISIT HI MDM: CPT | Mod: 25

## 2023-05-23 PROCEDURE — 85025 COMPLETE CBC W/AUTO DIFF WBC: CPT | Performed by: STUDENT IN AN ORGANIZED HEALTH CARE EDUCATION/TRAINING PROGRAM

## 2023-05-23 PROCEDURE — 83690 ASSAY OF LIPASE: CPT | Performed by: STUDENT IN AN ORGANIZED HEALTH CARE EDUCATION/TRAINING PROGRAM

## 2023-05-23 PROCEDURE — 83605 ASSAY OF LACTIC ACID: CPT | Performed by: STUDENT IN AN ORGANIZED HEALTH CARE EDUCATION/TRAINING PROGRAM

## 2023-05-23 PROCEDURE — 84484 ASSAY OF TROPONIN QUANT: CPT | Performed by: STUDENT IN AN ORGANIZED HEALTH CARE EDUCATION/TRAINING PROGRAM

## 2023-05-23 RX ORDER — HYDROCODONE BITARTRATE AND ACETAMINOPHEN 5; 325 MG/1; MG/1
1 TABLET ORAL EVERY 6 HOURS PRN
Qty: 12 TABLET | Refills: 0 | Status: SHIPPED | OUTPATIENT
Start: 2023-05-23

## 2023-05-23 RX ORDER — ONDANSETRON 4 MG/1
4 TABLET, FILM COATED ORAL EVERY 6 HOURS PRN
Qty: 12 TABLET | Refills: 0 | Status: SHIPPED | OUTPATIENT
Start: 2023-05-23

## 2023-05-23 RX ORDER — METRONIDAZOLE 500 MG/1
500 TABLET ORAL EVERY 8 HOURS
Qty: 30 TABLET | Refills: 0 | Status: SHIPPED | OUTPATIENT
Start: 2023-05-23 | End: 2023-06-02

## 2023-05-23 RX ORDER — DICYCLOMINE HYDROCHLORIDE 20 MG/1
20 TABLET ORAL 2 TIMES DAILY PRN
Qty: 20 TABLET | Refills: 0 | Status: SHIPPED | OUTPATIENT
Start: 2023-05-23 | End: 2023-06-22

## 2023-05-23 RX ORDER — CIPROFLOXACIN 500 MG/1
500 TABLET ORAL 2 TIMES DAILY
Qty: 20 TABLET | Refills: 0 | Status: SHIPPED | OUTPATIENT
Start: 2023-05-23 | End: 2023-06-02

## 2023-05-23 RX ADMIN — IOHEXOL 100 ML: 350 INJECTION, SOLUTION INTRAVENOUS at 09:05

## 2023-05-23 NOTE — Clinical Note
"Lian Flynn" Mckinley was seen and treated in our emergency department on 5/23/2023.  She may return to work on 05/26/2023.       If you have any questions or concerns, please don't hesitate to call.       RN    "

## 2023-05-24 NOTE — ED PROVIDER NOTES
Encounter Date: 5/23/2023       History     Chief Complaint   Patient presents with    Abdominal Pain     Pt reports sudden onset of lower abdominal pain with diarrhea.     Diarrhea     HPI    53-year-old female with no stated past medical history presents emergency department for abdominal pain with diarrhea the and nausea vomiting.  Patient states that everything started this morning.  States she feels weak.  States she had about 12 episodes of diarrhea today.  States she has no appetite.    Review of patient's allergies indicates:   Allergen Reactions    Ibuprofen Anaphylaxis    Benzocain-camphor-allatoin-pet     Celecoxib     Penicillins Itching    Sulfamethoxazole-trimethoprim Palpitations     No past medical history on file.  No past surgical history on file.  No family history on file.     Review of Systems   Constitutional:  Positive for fatigue and fever.   Respiratory:  Negative for cough and shortness of breath.    Cardiovascular:  Negative for chest pain.   Gastrointestinal:  Positive for abdominal pain, diarrhea, nausea and vomiting. Negative for constipation.   Genitourinary:  Negative for dysuria.   All other systems reviewed and are negative.    Physical Exam     Initial Vitals [05/23/23 1818]   BP Pulse Resp Temp SpO2   136/80 110 16 98.2 °F (36.8 °C) 100 %      MAP       --         Physical Exam    Nursing note and vitals reviewed.  Constitutional: She appears well-developed and well-nourished. No distress.   Cardiovascular:  Normal rate and regular rhythm.           Pulmonary/Chest: Breath sounds normal. No respiratory distress.   Abdominal: Abdomen is soft. There is abdominal tenderness in the right upper quadrant and epigastric area. There is rebound, guarding and positive Nelson's sign.   Musculoskeletal:         General: No tenderness. Normal range of motion.     Neurological: She is alert and oriented to person, place, and time. She has normal strength. GCS score is 15. GCS eye subscore is 4.  GCS verbal subscore is 5. GCS motor subscore is 6.   Skin: Skin is warm. Capillary refill takes less than 2 seconds.   Psychiatric: She has a normal mood and affect. Thought content normal.       ED Course   Procedures  Labs Reviewed   COMPREHENSIVE METABOLIC PANEL - Abnormal; Notable for the following components:       Result Value    Glucose Level 104 (*)     Creatinine 1.09 (*)     All other components within normal limits   CBC WITH DIFFERENTIAL - Abnormal; Notable for the following components:    WBC 13.24 (*)     RBC 5.57 (*)     MCV 72.2 (*)     MCH 21.9 (*)     MCHC 30.3 (*)     Neut # 10.86 (*)     All other components within normal limits   LIPASE - Normal   LACTIC ACID, PLASMA - Normal   TROPONIN I - Normal   BLOOD CULTURE [facility]   BLOOD CULTURE [facility]   CBC W/ AUTO DIFFERENTIAL    Narrative:     The following orders were created for panel order CBC auto differential.  Procedure                               Abnormality         Status                     ---------                               -----------         ------                     CBC with Differential[[id number]]        Abnormal            Final result                 Please view results for these tests on the individual orders.   URINALYSIS, REFLEX TO URINE CULTURE   EXTRA TUBES    Narrative:     The following orders were created for panel order EXTRA TUBES.  Procedure                               Abnormality         Status                     ---------                               -----------         ------                     Light Blue Top Hold[[id number]]                              In process                 Gold Top Hold[[id number]]                                    In process                   Please view results for these tests on the individual orders.   LIGHT BLUE TOP HOLD   GOLD TOP HOLD     EKG Readings: (Independently Interpreted)   Initial Reading: No STEMI. Rhythm: Normal Sinus Rhythm. Heart Rate: 86. Ectopy: No Ectopy. Conduction:  Normal. ST Segments: Normal ST Segments. T Waves: Normal. Clinical Impression: Normal Sinus Rhythm     Imaging Results              CT Abdomen Pelvis With Contrast (Final result)  Result time 05/23/23 22:12:51      Final result by Иван Ahuja MD (05/23/23 22:12:51)                   Impression:      1. Pronounced diffuse colon mural thickening and enhancement from the cecum to the rectosigmoid is consistent with nonspecific colitis which may be inflammatory or infectious.    2. Large uterine mass.  It is not certain whether this is within the endometrium or the myometrium.  Ultrasound may be of further help.      Electronically signed by: Иван Ahuja  Date:    05/23/2023  Time:    22:12               Narrative:    EXAMINATION:  CT ABDOMEN PELVIS WITH CONTRAST    CLINICAL HISTORY:  Abdominal abscess/infection suspected;    TECHNIQUE:  Multidetector axial images were obtained of the abdomen and pelvis following the administration of IV contrast. Oral contrast was not administered.    Dose length product of 897 mGycm. Automated exposure control was utilized to minimize radiation dose.    COMPARISON:  None available    FINDINGS:  Included portion of the lungs are without suspicious nodularity, acute air space infiltrates or fluid within the pleural spaces.    Hepatic volume and attenuation is unremarkable. Gallbladder wall is not thickened and there is no intra luminal calcified calculus. No biliary dilation identified. Pancreatic unremarkable attenuation without acute peripancreatic phlegmons. Main pancreatic duct is not dilated. Spleen is of normal size without focal lesion.    The adrenal glands appear within normal limits. The kidneys are unremarkable in size and contour. No solid or cystic renal lesion identified. There is no hydronephrosis. No perinephric fluid strandings or collections identified.    The stomach is  mostly decompressed.  No abnormal dilatation of loops of small bowel and there is no focal or  generalized mural thickening.  Appendix is nondilated and there are no periappendiceal inflammations.  There is concentric mural thickening and enhancement from the cecum to the rectosigmoid consistent with nonspecific colitis which may be inflammatory or infectious.  No pericolonic inflammatory standings no bowel obstruction no free fluid..  Small bowel is normal in caliber. The appendix appears unremarkable. There is no apparent colonic wall thickening.  Pericolonic fat is preserved. There is no evidence for bowel obstruction. There is no free air or free fluid.    Urinary bladder appears within normal limits.  There is uterine well-demarcated mass measuring 4.8 x 4.0 cm on sagittal image 99 series 602.  It is not certain whether this is within the endometrium of the myometrium.  This can be further assessed with ultrasound exam.  There is no pelvic free fluid.    No acute or otherwise osseous abnormality identified.                        Preliminary result by Red Gomez Jr., MD (05/23/23 21:56:58)                   Narrative:    START OF REPORT:  TECHNIQUE: CT OF THE ABDOMEN AND PELVIS WAS PERFORMED WITH AXIAL IMAGES AS WELL AS SAGITTAL AND CORONAL RECONSTRUCTION IMAGES WITH INTRAVENOUS CONTRAST BUT WITHOUT ORAL CONTRAST.    COMPARISON: NONE AVAILABLE.    CLINICAL HISTORY: SUDDEN ONSET OF ABD PAIN WITH DIARRHEA, WEAKNESS.    DOSAGE INFORMATION: AUTOMATED EXPOSURE CONTROL WAS UTILIZED.    Findings:  Lines and Tubes: None.  Thorax:  Lungs: The visualized lung bases appear unremarkable.  Pleura: No pleural effusion is seen.  Heart: The heart size is within normal limits.  Abdomen:  Abdominal Wall: No abdominal wall pathology is seen.  Liver: The liver appears unremarkable.  Biliary System: No intrahepatic or extrahepatic biliary duct dilatation is seen.  Gallbladder: The gallbladder appears unremarkable.  Pancreas: The pancreas appears unremarkable.  Spleen: The spleen appears unremarkable.  Adrenals: The  adrenal glands appear unremarkable.  Kidneys: The kidneys appear unremarkable with no stones cysts masses or hydronephrosis.  Aorta: The abdominal aorta appears unremarkable.  IVC: Unremarkable.  Bowel:  Esophagus: The visualized esophagus appears unremarkable.  Stomach: The stomach appears unremarkable.  Duodenum: Unremarkable appearing duodenum.  Small Bowel: The small bowel appears unremarkable.  Colon: There is mild circumferential wall thickening of the descending and sigmoid colon with subtle pericolonic fat stranding. This may be related to mild colitis.  Appendix: The appendix appears unremarkable (series 2 image 117-132).  Peritoneum: No intraperitoneal free air or ascites is seen.    Pelvis:  Bladder: The bladder appears unremarkable.  Female:  Uterus: A 5.2 cm relatively hypoenhancing lesion is seen in the left lateral wall of the uterus (series 2 image 143). This likely reflects a fibroid.  Ovaries: No adnexal masses are seen.    Bony structures:  Dorsal Spine: The visualized dorsal spine appears unremarkable.  Bony Pelvis: The visualized bony structures of the pelvis appear unremarkable.      Impression:  1. There is mild circumferential wall thickening of the descending and sigmoid colon with subtle pericolonic fat stranding. This may be related to mild colitis. Correlate with clinical and laboratory findings as regards additional evaluation and follow-up.  2. A 5.2 cm relatively hypoenhancing lesion is seen in the left lateral wall of the uterus (series 2 image 143). This likely reflects a fibroid.  3. Details and other findings as discussed above.                                         US Abdomen Limited (Final result)  Result time 05/23/23 21:10:55      Final result by Иван Ahuja MD (05/23/23 21:10:55)                   Impression:      1.  Mild hepatic steatosis.    2.  Small right kidney      Electronically signed by: Иван Ahuja  Date:    05/23/2023  Time:    21:10               Narrative:     EXAMINATION:  US ABDOMEN LIMITED    CLINICAL HISTORY:  Right upper quadrant pain.    TECHNIQUE:  Multiple real-time transverse and longitudinal sections were performed of the right abdomen by the sonographer. Select images were submitted for review.    FINDINGS:  Hepatic parenchyma is echogenic reflective of some steatosis.  There was no delineation of discrete hepatic cystic or solid mass. Hepatic maximum diameter of 17.8 cm. There was unremarkable hepatopedal flow within the portal vein.  Pancreatic body and tail is obscured by overlying bowel gas.  No dominant findings of the pancreatic head identified.    Gallbladder appears contracted which results in some thickened appearance of the wall.  Gallbladder lumen is without echogenicity indicative of sludge or cholelithiasis.  Common bile duct caliber of 3.5 mm is within normal limits for the age. Sonographer reported negative Nelson's sign.    Normally located right kidney length measures 7.8 x 3.6 x 4.6 cm. Right renal corticomedullary differentiation is unremarkable. No evidence of hydronephrosis.                                       Medications   iohexoL (OMNIPAQUE 350) 350 mg iodine/mL injection (100 mLs Intravenous Given 5/23/23 2145)     Medical Decision Making:   Differential Diagnosis:   Diverticulitis, cholecystitis, pancreatitis, gastroenteritis, colitis, ACS         Medical Decision Making  Problems Addressed:  Colitis: acute illness or injury  Uterine mass: undiagnosed new problem with uncertain prognosis    Amount and/or Complexity of Data Reviewed  Labs: ordered. Decision-making details documented in ED Course.  Radiology: ordered.  ECG/medicine tests: ordered and independent interpretation performed. Decision-making details documented in ED Course.    Risk  OTC drugs.  Prescription drug management.  Decision regarding hospitalization.        ED Course as of 05/23/23 2234   Tue May 23, 2023   2020 Lipase: 25 [BS]   2020 Troponin I: <0.010 [BS]    2020 WBC(!): 13.24 [BS]   2020 Hemoglobin: 12.2 [BS]   2020 Hematocrit: 40.2 [BS]   2020 Platelets: 315 [BS]   2021 Sodium: 137 [BS]   2021 Potassium: 3.8 [BS]   2021 Chloride: 105 [BS]   2021 CO2: 24 [BS]   2021 BUN: 17.6 [BS]   2021 Creatinine(!): 1.09 [BS]   2021 BILIRUBIN TOTAL: 0.2 [BS]   2021 Alkaline Phosphatase: 89 [BS]   2021 ALT: 17 [BS]   2021 AST: 13 [BS]   2229 Patient tolerated p.o..  CT showing colitis.  Will discharge on antibiotics.  Return precautions given and patient understands.  A uterine mass was noted the patient states she has a fibroid. [BS]      ED Course User Index  [BS] Renato Grover MD                   Clinical Impression:   Final diagnoses:  [R10.13] Epigastric pain  [K52.9] Colitis (Primary)  [N85.8] Uterine mass        ED Disposition Condition    Discharge Stable          ED Prescriptions       Medication Sig Dispense Start Date End Date Auth. Provider    ciprofloxacin HCl (CIPRO) 500 MG tablet Take 1 tablet (500 mg total) by mouth 2 (two) times daily. for 10 days 20 tablet 5/23/2023 6/2/2023 Renato Grover MD    metroNIDAZOLE (FLAGYL) 500 MG tablet Take 1 tablet (500 mg total) by mouth every 8 (eight) hours. for 10 days 30 tablet 5/23/2023 6/2/2023 Renato Grover MD    ondansetron (ZOFRAN) 4 MG tablet Take 1 tablet (4 mg total) by mouth every 6 (six) hours as needed for Nausea. 12 tablet 5/23/2023 -- Renato Grover MD    dicyclomine (BENTYL) 20 mg tablet Take 1 tablet (20 mg total) by mouth 2 (two) times daily as needed (Abdominal cramps). 20 tablet 5/23/2023 6/22/2023 Renato Grover MD    HYDROcodone-acetaminophen (NORCO) 5-325 mg per tablet Take 1 tablet by mouth every 6 (six) hours as needed for Pain. 12 tablet 5/23/2023 -- Renato Grover MD          Follow-up Information       Follow up With Specialties Details Why Contact Info    [facility] Emergency Dept Emergency Medicine Go to  If symptoms worsen [address] [facility]  [address]  [phone]    Follow-up with ZELALEM Grover MD  05/23/23 [id number]

## 2023-05-29 LAB
BACTERIA BLD CULT: NORMAL
BACTERIA BLD CULT: NORMAL

## 2023-12-20 ENCOUNTER — HOSPITAL ENCOUNTER (EMERGENCY)
Facility: HOSPITAL | Age: 53
Discharge: HOME | End: 2023-12-20
Attending: EMERGENCY MEDICINE

## 2023-12-20 VITALS
DIASTOLIC BLOOD PRESSURE: 86 MMHG | WEIGHT: 183 LBS | TEMPERATURE: 98 F | OXYGEN SATURATION: 98 % | HEART RATE: 98 BPM | BODY MASS INDEX: 33.68 KG/M2 | RESPIRATION RATE: 16 BRPM | SYSTOLIC BLOOD PRESSURE: 134 MMHG | HEIGHT: 62 IN

## 2023-12-20 DIAGNOSIS — R05.9 COUGH: ICD-10-CM

## 2023-12-20 DIAGNOSIS — U07.1 COVID: Primary | ICD-10-CM

## 2023-12-20 LAB
FLUAV AG UPPER RESP QL IA.RAPID: NOT DETECTED
FLUBV AG UPPER RESP QL IA.RAPID: NOT DETECTED
RSV A 5' UTR RNA NPH QL NAA+PROBE: NOT DETECTED
SARS-COV-2 RNA RESP QL NAA+PROBE: DETECTED
STREP A PCR (OHS): NOT DETECTED

## 2023-12-20 PROCEDURE — 99283 EMERGENCY DEPT VISIT LOW MDM: CPT | Mod: 25

## 2023-12-20 PROCEDURE — 0241U COVID/RSV/FLU A&B PCR: CPT | Performed by: NURSE PRACTITIONER

## 2023-12-20 PROCEDURE — 87651 STREP A DNA AMP PROBE: CPT | Performed by: NURSE PRACTITIONER

## 2023-12-20 NOTE — Clinical Note
"Lian"Benigno Sen was seen and treated in our emergency department on 12/20/2023.     COVID-19 is present in our communities across the state. There is limited testing for COVID at this time, so not all patients can be tested. In this situation, your employee meets the following criteria:    Lian Sen has met the criteria for COVID-19 testing and has a POSITIVE result. She can return to work once they are asymptomatic for 24 hours without the use of fever reducing medications AND at least five days from the first positive result. A mask is recommended for 5 days post quarantine.     If you have any questions or concerns, or if I can be of further assistance, please do not hesitate to contact me.    Sincerely,             Herminio Lundberg, DO"

## 2023-12-20 NOTE — ED PROVIDER NOTES
Encounter Date: 12/20/2023       History     Chief Complaint   Patient presents with    Cough     Dry persistent cough x3 days, denies fever     The patient presents with occas nonprod cough, sore throat, nasal congestion, subjective fever, no cp or sob, no known covid-19 exposure.  The onset was 3 days ago.  The course/duration of symptoms is constant.  The degree at present is minimal.  The exacerbating factor is none.  The relieving factor is none.  Risk factors consist of none.  Prior episodes: occasional.  Associated symptoms: dry cough, nasal congestion, rhinorrhea, sore throat, denies chest pain and denies shortness of breath.  Additional history: none.         Review of patient's allergies indicates:   Allergen Reactions    Ibuprofen Anaphylaxis    Benzocain-camphor-allatoin-pet     Celecoxib     Penicillins Itching    Sulfamethoxazole-trimethoprim Palpitations     Past Medical History:   Diagnosis Date    High cholesterol     TMJ (dislocation of temporomandibular joint)      Past Surgical History:   Procedure Laterality Date    TUBAL LIGATION       History reviewed. No pertinent family history.  Social History     Tobacco Use    Smoking status: Never    Smokeless tobacco: Never   Substance Use Topics    Alcohol use: Yes     Comment: occassionally    Drug use: Never     Review of Systems   Constitutional:  Positive for fever.   HENT:  Positive for congestion and sore throat.    Respiratory:  Positive for cough. Negative for shortness of breath.    Cardiovascular:  Negative for chest pain.   Gastrointestinal:  Negative for nausea.   Genitourinary:  Negative for dysuria.   Musculoskeletal:  Negative for back pain.   Skin:  Negative for rash.   Neurological:  Negative for weakness.   Hematological:  Does not bruise/bleed easily.   All other systems reviewed and are negative.      Physical Exam     Initial Vitals [12/20/23 1729]   BP Pulse Resp Temp SpO2   (!) 144/82 108 20 97.8 °F (36.6 °C) 99 %      MAP        --         Physical Exam    Nursing note and vitals reviewed.  Constitutional: She appears well-developed and well-nourished.   HENT:   Head: Normocephalic and atraumatic.   Right Ear: Tympanic membrane normal.   Left Ear: Tympanic membrane normal.   Nose: Nose normal.   Mouth/Throat: Uvula is midline, oropharynx is clear and moist and mucous membranes are normal.   Neck: Neck supple.   Normal range of motion.  Cardiovascular:  Normal rate, regular rhythm, normal heart sounds and intact distal pulses.           Pulmonary/Chest: Breath sounds normal.   Abdominal: Abdomen is soft. Bowel sounds are normal.   Musculoskeletal:         General: Normal range of motion.      Cervical back: Normal range of motion and neck supple.     Neurological: She is alert. She has normal strength.   Skin: Skin is warm and dry.   Psychiatric: She has a normal mood and affect.         ED Course   Procedures  Labs Reviewed   COVID/RSV/FLU A&B PCR - Abnormal; Notable for the following components:       Result Value    SARS-CoV-2 PCR Detected (*)     All other components within normal limits    Narrative:     The Xpert Xpress SARS-CoV-2/FLU/RSV plus is a rapid, multiplexed real-time PCR test intended for the simultaneous qualitative detection and differentiation of SARS-CoV-2, Influenza A, Influenza B, and respiratory syncytial virus (RSV) viral RNA in either nasopharyngeal swab or nasal swab specimens.         STREP GROUP A BY PCR - Normal    Narrative:     The Xpert Xpress Strep A test is a rapid, qualitative in vitro diagnostic test for the detection of Streptococcus pyogenes (Group A ß-hemolytic Streptococcus, Strep A) in throat swab specimens from patients with signs and symptoms of pharyngitis.            Imaging Results              X-Ray Chest AP Portable (Final result)  Result time 12/20/23 18:13:04      Final result by Sagar Almendarez MD (12/20/23 18:13:04)                   Impression:      No abnormality  seen      Electronically signed by: Collins Almendarez  Date:    12/20/2023  Time:    18:13               Narrative:    EXAMINATION:  XR CHEST AP PORTABLE    CLINICAL HISTORY:  Cough, unspecified    TECHNIQUE:  Single frontal view of the chest was performed.    COMPARISON:  04/07/2022    FINDINGS:  The lungs are clear.  The heart is normal appearance.  The pulmonary vascularity is unremarkable.  Aorta appears grossly unremarkable.  No pleural effusions are seen.  Bones and joints show no acute abnormality.                                       Medications - No data to display  Medical Decision Making  The patient presents with occas nonprod cough, sore throat, nasal congestion, subjective fever, no cp or sob, no known covid-19 exposure.  The onset was 3 days ago.  The course/duration of symptoms is constant.  The degree at present is minimal.  The exacerbating factor is none.  The relieving factor is none.  Risk factors consist of none.  Prior episodes: occasional.  Associated symptoms: dry cough, nasal congestion, rhinorrhea, sore throat, denies chest pain and denies shortness of breath.  Additional history: none.       Resp even/nonlabored, BBS clear, o2 sat 99%, will f/u with pcp, strict return to ER and covid precautions given.    Amount and/or Complexity of Data Reviewed  Labs: ordered. Decision-making details documented in ED Course.  Radiology: ordered. Decision-making details documented in ED Course.      Additional MDM:   Differential Diagnosis:   Covid, influenza, strep Throat, viral pharyngitis, mononucleosis, HIV, GC, Herpangina, Oral candida, diphtheria, among others              ED Course as of 12/20/23 1843   Wed Dec 20, 2023   1822 X-Ray Chest AP Portable  Impression:     No abnormality seen   [RB]   1839 SARS-CoV2 (COVID-19) Qualitative PCR(!): Detected [RB]      ED Course User Index  [RB] Hank Fernandez, СЕРГЕЙ                           Clinical Impression:  Final diagnoses:  [R05.9] Cough  [U07.1] COVID  (Primary)          ED Disposition Condition    Discharge Stable          ED Prescriptions    None       Follow-up Information       Follow up With Specialties Details Why Contact Info    follow up with your primary care provider in 3-5 days        [facility] Dept Emergency Medicine  If symptoms worsen [address] [facility] [id number] [phone]             Hank Fernandez, ACNP  12/20/23 6079